# Patient Record
Sex: FEMALE | URBAN - METROPOLITAN AREA
[De-identification: names, ages, dates, MRNs, and addresses within clinical notes are randomized per-mention and may not be internally consistent; named-entity substitution may affect disease eponyms.]

---

## 2023-05-22 ENCOUNTER — TELEPHONE (OUTPATIENT)
Dept: RHEUMATOLOGY | Age: 23
End: 2023-05-22

## 2023-07-30 NOTE — PROGRESS NOTES
RHEUMATOLOGY NEW PATIENT VISIT    2023      Patient Name: Joey Zabala  : 2000  Medical Record: [id number]      CHIEF COMPLAINT  Abnormal LACEY  dsDNA +  Thrombocytosis   Left hip and knee pain      Pertinent Problems    HISTORY OF PRESENT ILLNESS    Joey Zabala is a 26 y.o. female who was referred  for above concerns. Symptom onset since childhood that was associated with muscle weakness and pain, which was attributed to being a child. Her symptoms have persisted over time- muscle pain and weakness. Her sister was diagnosed with lupus. In 2023 she was seen for leg pain and diffuse muscle pain. Lab work was reported to show abnormal LACEY , elevated DsDNA and thrombocytosis. There is pain in upper back and shoulders  She reports intermittent swelling in her ankle and knee   There is morning stiffness lasting 1-2 hours   Nothing relieves her pain  Cold weather worsens her pain      Hair loss: Denies  Oral Ulcers: Denies  Dry eyes and mouth: Dry mouth +  Rashes: Denies  Photosensitivity: Denies   Photophobia: Denies  Joint Pains: +  Raynaud's: + whitish discoloration on fingers and toes  Chest Pain: + s/p breast reduction surgery   SOB: Denies  Complications during pregnancy: Denies  Blood Clots: Denies  Seizures/strokes: Denies  Anemia/thrombocytopenia/leukopenia: thrombocytosis+  Antibodies: dsDNA+     Denies smoking, rarely etoh,obesity+, no recreational drug use. Sister has lupus. Current rheum meds:     Past rheum meds:     No flowsheet data found.         REVIEW OF SYSTEMS     Constitutional:  Denies fever or chills, decreased appetite, or weight loss   Eyes:  Denies change in visual acuity or eye dryness or irritation  HENT:  Dry mouth+  Respiratory:  Denies cough or shortness of breath   Cardiovascular:  Denies chest pain or edema   GI:  Denies abdominal pain, nausea, vomiting, bloody stools or diarrhea   :  Denies dysuria or hematuria  Musculoskeletal:  See HPI  Integument:  Denies rash

## 2023-08-01 ENCOUNTER — OFFICE VISIT (OUTPATIENT)
Dept: RHEUMATOLOGY | Age: 23
End: 2023-08-01
Payer: COMMERCIAL

## 2023-08-01 ENCOUNTER — HOSPITAL ENCOUNTER (OUTPATIENT)
Age: 23
Discharge: HOME | End: 2023-08-01
Payer: COMMERCIAL

## 2023-08-01 VITALS
WEIGHT: 224 LBS | OXYGEN SATURATION: 97 % | HEART RATE: 99 BPM | DIASTOLIC BLOOD PRESSURE: 65 MMHG | SYSTOLIC BLOOD PRESSURE: 120 MMHG

## 2023-08-01 DIAGNOSIS — I73.00 RAYNAUD'S DISEASE WITHOUT GANGRENE: ICD-10-CM

## 2023-08-01 DIAGNOSIS — Z01.89 ENCOUNTER FOR OTHER SPECIFIED SPECIAL EXAMINATIONS: ICD-10-CM

## 2023-08-01 DIAGNOSIS — R89.4 SEROLOGIC ABNORMALITY: Primary | ICD-10-CM

## 2023-08-01 DIAGNOSIS — M25.50 POLYARTHRALGIA: ICD-10-CM

## 2023-08-01 DIAGNOSIS — R89.4 SEROLOGIC ABNORMALITY: ICD-10-CM

## 2023-08-01 LAB
25(OH)D3 SERPL-MCNC: 18.9 NG/ML
ALBUMIN SERPL-MCNC: 4.7 GM/DL (ref 3.4–5)
ALBUMIN SERPL-MCNC: 4.7 GM/DL (ref 3.4–5)
ALP BLD-CCNC: 80 IU/L (ref 40–129)
ALT SERPL-CCNC: 17 U/L (ref 10–40)
ANION GAP SERPL CALCULATED.3IONS-SCNC: 13 MMOL/L (ref 4–16)
AST SERPL-CCNC: 18 IU/L (ref 15–37)
BILIRUB SERPL-MCNC: 0.2 MG/DL (ref 0–1)
BILIRUBIN DIRECT: 0.2 MG/DL (ref 0–0.3)
BILIRUBIN URINE: NEGATIVE MG/DL
BILIRUBIN, INDIRECT: 0 MG/DL (ref 0–0.7)
BLOOD, URINE: NEGATIVE
BUN SERPL-MCNC: 10 MG/DL (ref 6–23)
CALCIUM SERPL-MCNC: 10.1 MG/DL (ref 8.3–10.6)
CHLORIDE BLD-SCNC: 103 MMOL/L (ref 99–110)
CLARITY: CLEAR
CO2: 23 MMOL/L (ref 21–32)
COLOR: YELLOW
COMMENT UA: ABNORMAL
CREAT SERPL-MCNC: 0.7 MG/DL (ref 0.6–1.1)
CREATININE URINE: 267.9 MG/DL (ref 28–217)
CRP SERPL HS-MCNC: 11 MG/L
ERYTHROCYTE SEDIMENTATION RATE: 17 MM/HR (ref 0–20)
GFR SERPL CREATININE-BSD FRML MDRD: >60 ML/MIN/1.73M2
GLUCOSE SERPL-MCNC: 76 MG/DL (ref 70–99)
GLUCOSE, URINE: NEGATIVE MG/DL
HAV IGM SERPL QL IA: NON REACTIVE
HBV CORE IGM SERPL QL IA: NON REACTIVE
HBV SURFACE AG SERPL QL IA: NON REACTIVE
HCT VFR BLD CALC: 38.9 % (ref 37–47)
HCV AB SERPL QL IA: NON REACTIVE
HEMOGLOBIN: 11.4 GM/DL (ref 12.5–16)
KETONES, URINE: ABNORMAL MG/DL
LEUKOCYTE ESTERASE, URINE: NEGATIVE
MCH RBC QN AUTO: 21.7 PG (ref 27–31)
MCHC RBC AUTO-ENTMCNC: 29.3 % (ref 32–36)
MCV RBC AUTO: 74 FL (ref 78–100)
NITRITE URINE, QUANTITATIVE: NEGATIVE
PDW BLD-RTO: 14.9 % (ref 11.7–14.9)
PH, URINE: 5.5 (ref 5–8)
PHOSPHORUS: 3.8 MG/DL (ref 2.5–4.9)
PLATELET # BLD: 467 K/CU MM (ref 140–440)
PMV BLD AUTO: 9.7 FL (ref 7.5–11.1)
POTASSIUM SERPL-SCNC: 4 MMOL/L (ref 3.5–5.1)
PROT/CREAT RATIO, UR: 0.1
PROTEIN UA: NEGATIVE MG/DL
RBC # BLD: 5.26 M/CU MM (ref 4.2–5.4)
SODIUM BLD-SCNC: 139 MMOL/L (ref 135–145)
SPECIFIC GRAVITY UA: >1.03 (ref 1–1.03)
TOTAL PROTEIN: 7.3 GM/DL (ref 6.4–8.2)
URINE TOTAL PROTEIN: 16.9 MG/DL
UROBILINOGEN, URINE: 0.2 MG/DL (ref 0.2–1)
WBC # BLD: 9.4 K/CU MM (ref 4–10.5)

## 2023-08-01 PROCEDURE — 86160 COMPLEMENT ANTIGEN: CPT

## 2023-08-01 PROCEDURE — 99205 OFFICE O/P NEW HI 60 MIN: CPT | Performed by: STUDENT IN AN ORGANIZED HEALTH CARE EDUCATION/TRAINING PROGRAM

## 2023-08-01 PROCEDURE — 86140 C-REACTIVE PROTEIN: CPT

## 2023-08-01 PROCEDURE — 85652 RBC SED RATE AUTOMATED: CPT

## 2023-08-01 PROCEDURE — 84100 ASSAY OF PHOSPHORUS: CPT

## 2023-08-01 PROCEDURE — 85613 RUSSELL VIPER VENOM DILUTED: CPT

## 2023-08-01 PROCEDURE — 85027 COMPLETE CBC AUTOMATED: CPT

## 2023-08-01 PROCEDURE — 82248 BILIRUBIN DIRECT: CPT

## 2023-08-01 PROCEDURE — 80074 ACUTE HEPATITIS PANEL: CPT

## 2023-08-01 PROCEDURE — 84156 ASSAY OF PROTEIN URINE: CPT

## 2023-08-01 PROCEDURE — 80053 COMPREHEN METABOLIC PANEL: CPT

## 2023-08-01 PROCEDURE — 86200 CCP ANTIBODY: CPT

## 2023-08-01 PROCEDURE — 81003 URINALYSIS AUTO W/O SCOPE: CPT

## 2023-08-01 PROCEDURE — 82306 VITAMIN D 25 HYDROXY: CPT

## 2023-08-01 PROCEDURE — 86430 RHEUMATOID FACTOR TEST QUAL: CPT

## 2023-08-01 PROCEDURE — 36415 COLL VENOUS BLD VENIPUNCTURE: CPT

## 2023-08-01 PROCEDURE — 86480 TB TEST CELL IMMUN MEASURE: CPT

## 2023-08-01 PROCEDURE — 82570 ASSAY OF URINE CREATININE: CPT

## 2023-08-03 LAB
C3 SERPL-MCNC: 166 MG/DL (ref 90–180)
C4 SERPL-MCNC: 33 MG/DL (ref 10–40)
RHEUMATOID FACTOR: <10 IU/ML (ref 0–14)

## 2023-08-04 LAB
CCP IGG SERPL-ACNC: 13 UNITS (ref 0–19)
CONFIRM DRVVT: ABNORMAL RATIO
MIXING DRVVT: ABNORMAL SEC (ref 33–44)
QUANTI TB1 MINUS NIL: 0 IU/ML (ref 0–0.34)
QUANTI TB2 MINUS NIL: 0 IU/ML (ref 0–0.34)
QUANTIFERON (R) TB GOLD (INCUBATED): NEGATIVE IU/ML
QUANTIFERON MITOGEN MINUS NIL: >10 IU/ML
QUANTIFERON NIL: 0.01 IU/ML
SCREEN DRVVT: 31 SEC (ref 33–44)

## 2023-08-11 ENCOUNTER — TELEPHONE (OUTPATIENT)
Dept: RHEUMATOLOGY | Age: 23
End: 2023-08-11

## 2023-08-11 NOTE — TELEPHONE ENCOUNTER
----- Message from Lauren Haines MD sent at 8/10/2023  9:16 AM EDT -----  I have received [facility]. I will like to see patient sooner to discuss her results. However, if there are no openings available in the next 2 weeks, she can keep her existing appointment. Add her to the wait list in case something opens up.

## 2023-08-12 NOTE — PROGRESS NOTES
RHEUMATOLOGY FOLLOW-UP VISIT    2023      Patient Name: Ingrid Herbert  : 2000  Medical Record: [id number]      CHIEF COMPLAINT  Left hip and knee pain  Elevated CRP  Thrombocytosis   Left hip and knee pain      Pertinent Problems    HISTORY OF PRESENT ILLNESS    Ingrid Herbert is a 26 y.o. female who established on 2023. Symptom onset since childhood that was associated with muscle weakness and pain, which was attributed to being a child. Her symptoms have persisted over time- muscle pain and weakness. Her sister was diagnosed with lupus. In 2023 she was seen for leg pain and diffuse muscle pain. Lab work was reported to show abnormal LACEY , elevated DsDNA and thrombocytosis. There is pain in upper back and shoulders  She reports intermittent swelling in her ankle and knee   There is morning stiffness lasting 1-2 hours   Nothing relieves her pain  Cold weather worsens her pain      Dry eyes and mouth: Dry mouth +  Rashes: Denies  Joint Pains: +  Raynaud's: + whitish discoloration on fingers and toes  Chest Pain: + s/p breast reduction surgery   SOB: Denies  Complications during pregnancy: Denies  Blood Clots: Denies  Seizures/strokes: Denies  Anemia/thrombocytopenia/leukopenia: thrombocytosis+      Denies smoking, rarely etoh,obesity+, no recreational drug use. Sister has lupus. LCV: 2023  WBC normal  Hemoglobin 11.4, L  Platelets 600, H  RF negative  C4 normal  C3 normal  CRP 11, H  ESR normal  LACEY by IFA neg   Subserologies neg     Subjective:  Patient is here to discuss her results  Pain in upper back, shoulders ankle and knee  Swelling +  Stiffness +    Current rheum meds: none    Past rheum meds:     No flowsheet data found.         REVIEW OF SYSTEMS     Constitutional:  Denies fever or chills, decreased appetite, or weight loss   Eyes:  Denies change in visual acuity or eye dryness or irritation  HENT:  Dry mouth+  Respiratory:  Denies cough or shortness of breath   Cardiovascular:  Denies

## 2023-08-14 ENCOUNTER — OFFICE VISIT (OUTPATIENT)
Dept: RHEUMATOLOGY | Age: 23
End: 2023-08-14
Payer: COMMERCIAL

## 2023-08-14 VITALS
OXYGEN SATURATION: 99 % | WEIGHT: 226 LBS | DIASTOLIC BLOOD PRESSURE: 80 MMHG | SYSTOLIC BLOOD PRESSURE: 100 MMHG | HEART RATE: 94 BPM

## 2023-08-14 DIAGNOSIS — Z51.81 ENCOUNTER FOR MONITORING OF HYDROXYCHLOROQUINE THERAPY: ICD-10-CM

## 2023-08-14 DIAGNOSIS — R89.4 SEROLOGIC ABNORMALITY: ICD-10-CM

## 2023-08-14 DIAGNOSIS — I73.00 RAYNAUD'S DISEASE WITHOUT GANGRENE: ICD-10-CM

## 2023-08-14 DIAGNOSIS — M06.4 INFLAMMATORY POLYARTHRITIS (HCC): Primary | ICD-10-CM

## 2023-08-14 DIAGNOSIS — Z79.899 ENCOUNTER FOR MONITORING OF HYDROXYCHLOROQUINE THERAPY: ICD-10-CM

## 2023-08-14 PROCEDURE — 99214 OFFICE O/P EST MOD 30 MIN: CPT | Performed by: STUDENT IN AN ORGANIZED HEALTH CARE EDUCATION/TRAINING PROGRAM

## 2023-08-14 RX ORDER — HYDROXYCHLOROQUINE SULFATE 200 MG/1
200 TABLET, FILM COATED ORAL 2 TIMES DAILY
Qty: 120 TABLET | Refills: 0 | Status: SHIPPED | OUTPATIENT
Start: 2023-08-14

## 2023-10-23 ENCOUNTER — TELEPHONE (OUTPATIENT)
Dept: RHEUMATOLOGY | Age: 23
End: 2023-10-23

## 2023-10-23 NOTE — TELEPHONE ENCOUNTER
----- Message from Michael Alcantara MD sent at 10/22/2023  8:46 AM EDT -----  Please notify patient to get ordered labs prior to her next appointment on 10/25.